# Patient Record
Sex: FEMALE
[De-identification: names, ages, dates, MRNs, and addresses within clinical notes are randomized per-mention and may not be internally consistent; named-entity substitution may affect disease eponyms.]

---

## 2018-10-25 ENCOUNTER — RX ONLY (RX ONLY)
Age: 78
End: 2018-10-25

## 2019-07-16 ENCOUNTER — RX ONLY (RX ONLY)
Age: 79
End: 2019-07-16

## 2020-07-29 ENCOUNTER — APPOINTMENT (OUTPATIENT)
Dept: URBAN - METROPOLITAN AREA SURGERY 9 | Age: 80
Setting detail: DERMATOLOGY
End: 2020-07-29

## 2020-07-29 VITALS — RESPIRATION RATE: 12 BRPM | DIASTOLIC BLOOD PRESSURE: 64 MMHG | HEART RATE: 84 BPM | SYSTOLIC BLOOD PRESSURE: 102 MMHG

## 2020-07-29 PROBLEM — C00.1 MALIGNANT NEOPLASM OF EXTERNAL LOWER LIP: Status: ACTIVE | Noted: 2020-07-29

## 2020-07-29 PROCEDURE — 17312 MOHS ADDL STAGE: CPT

## 2020-07-29 PROCEDURE — OTHER MOHS SURGERY: OTHER

## 2020-07-29 PROCEDURE — OTHER PRESCRIPTION: OTHER

## 2020-07-29 PROCEDURE — OTHER CONSULTATION FOR MOHS SURGERY: OTHER

## 2020-07-29 PROCEDURE — 17311 MOHS 1 STAGE H/N/HF/G: CPT

## 2020-07-29 PROCEDURE — OTHER RETURN TO REFERRING PROVIDER: OTHER

## 2020-07-29 RX ORDER — HYDROCODONE BITARTRATE AND ACETAMINOPHEN 5; 325 MG/1; MG/1
TABLET ORAL
Qty: 10 | Refills: 0 | COMMUNITY
Start: 2020-07-29

## 2020-07-29 NOTE — PROCEDURE: CONSULTATION FOR MOHS SURGERY
Detail Level: Detailed
Body Location Override (Optional - Billing Will Still Be Based On Selected Body Map Location If Applicable): right lower lip
X Size Of Lesion In Cm (Optional): 0
Incorporate Mauc In Note: No

## 2020-07-29 NOTE — PROCEDURE: MOHS SURGERY
Pt with complaints of right lower leg discomfort into heel. Spoke with Dr Mo who assured pt that she had excellent flow to right leg on angiogram and that he thinks it is re-perfusion.   Consent (Lip)/Introductory Paragraph: The rationale for Mohs was explained to the patient and consent was obtained. The risks, benefits and alternatives to therapy were discussed in detail. Specifically, the risks of lip deformity, changes in the oral aperture, infection, scarring, bleeding, prolonged wound healing, incomplete removal, allergy to anesthesia, nerve injury and recurrence were addressed. Prior to the procedure, the treatment site was clearly identified and confirmed by the patient. All components of Universal Protocol/PAUSE Rule completed.

## 2021-02-08 ENCOUNTER — RX ONLY (RX ONLY)
Age: 81
End: 2021-02-08

## 2022-03-24 NOTE — PROCEDURE: MOHS SURGERY
[Takes medication as prescribed] : takes [None] : Patient does not have any barriers to medication adherence [Yes] : Reviewed medication list for presence of high-risk medications. Modified Advancement Flap Text: The defect edges were debeveled with a #15 scalpel blade.  Given the location of the defect, shape of the defect and the proximity to free margins a modified advancement flap was deemed most appropriate.  Using a sterile surgical marker, an appropriate advancement flap was drawn incorporating the defect and placing the expected incisions within the relaxed skin tension lines where possible.    The area thus outlined was incised deep to adipose tissue with a #15 scalpel blade.  The skin margins were undermined to an appropriate distance in all directions utilizing iris scissors.
